# Patient Record
Sex: MALE | Race: OTHER | HISPANIC OR LATINO | Employment: FULL TIME | URBAN - NONMETROPOLITAN AREA
[De-identification: names, ages, dates, MRNs, and addresses within clinical notes are randomized per-mention and may not be internally consistent; named-entity substitution may affect disease eponyms.]

---

## 2023-01-05 ENCOUNTER — OCCUPATIONAL MEDICINE (OUTPATIENT)
Dept: URGENT CARE | Facility: CLINIC | Age: 38
End: 2023-01-05
Payer: COMMERCIAL

## 2023-01-05 ENCOUNTER — APPOINTMENT (OUTPATIENT)
Dept: RADIOLOGY | Facility: IMAGING CENTER | Age: 38
End: 2023-01-05
Attending: FAMILY MEDICINE
Payer: COMMERCIAL

## 2023-01-05 VITALS
RESPIRATION RATE: 16 BRPM | SYSTOLIC BLOOD PRESSURE: 122 MMHG | HEART RATE: 60 BPM | TEMPERATURE: 98.3 F | HEIGHT: 70 IN | DIASTOLIC BLOOD PRESSURE: 84 MMHG | WEIGHT: 210 LBS | OXYGEN SATURATION: 99 % | BODY MASS INDEX: 30.06 KG/M2

## 2023-01-05 DIAGNOSIS — S39.012A BACK STRAIN, INITIAL ENCOUNTER: ICD-10-CM

## 2023-01-05 PROCEDURE — 72070 X-RAY EXAM THORAC SPINE 2VWS: CPT | Mod: TC | Performed by: RADIOLOGY

## 2023-01-05 PROCEDURE — 99203 OFFICE O/P NEW LOW 30 MIN: CPT | Performed by: FAMILY MEDICINE

## 2023-01-05 RX ORDER — MELOXICAM 7.5 MG/1
7.5 TABLET ORAL DAILY
Qty: 7 TABLET | Refills: 0 | Status: SHIPPED | OUTPATIENT
Start: 2023-01-05

## 2023-01-05 RX ORDER — LIDOCAINE AND PRILOCAINE 25; 25 MG/G; MG/G
CREAM TOPICAL
COMMUNITY
Start: 2022-12-30 | End: 2023-01-05

## 2023-01-05 RX ORDER — CYCLOBENZAPRINE HCL 5 MG
5 TABLET ORAL 3 TIMES DAILY PRN
Qty: 30 TABLET | Refills: 0 | Status: SHIPPED | OUTPATIENT
Start: 2023-01-05

## 2023-01-05 RX ORDER — CYCLOBENZAPRINE HCL 10 MG
TABLET ORAL
COMMUNITY
Start: 2022-12-30 | End: 2023-01-05

## 2023-01-05 ASSESSMENT — ENCOUNTER SYMPTOMS
MYALGIAS: 1
BACK PAIN: 1

## 2023-01-05 NOTE — LETTER
[facility]  [address] [place] [address]  Phone:  [phone] - Fax:  [phone]   [facility] Progress Report and Disability Certification  Date of Service: 1/5/2023   No Show:  No  Date / Time of Next Visit: 1/11/2023 Edson Kwok   Claim Information   Patient Name: Juan Hi  Claim Number:     Employer: [employer]  Date of Injury: 12/28/2022     Insurer / TPA: [employer]  ID / SSN:     Occupation: Production  Diagnosis: The encounter diagnosis was Back strain, initial encounter.    Medical Information   Related to Industrial Injury? Yes    Subjective Complaints:  DOI 12/28/22  RODGER: working in awkward position for about an hour to install a mud flap that didn't fit correctly into wheel well of a truck and while doing this sneezed and felt pain to upper Rt side of back. Is radiating up to shoulder/trap area and initially to lower back as well but that area has improved. No limb weakness, worse w/ certain movements/positions.    Objective Findings:     Pre-Existing Condition(s):     Assessment:   Initial Visit    Status: Additional Care Required  Permanent Disability:No    Plan: Medication    Diagnostics: X-ray    Comments:       Disability Information   Status: Released to Restricted Duty    From:  1/5/2023  Through: 1/11/2023 Restrictions are: Temporary   Physical Restrictions   Sitting:    Standing:    Stooping:    Bending:      Squatting:    Walking:    Climbing:    Pushing:      Pulling:    Other:    Reaching Above Shoulder (L):   Reaching Above Shoulder (R):       Reaching Below Shoulder (L):    Reaching Below Shoulder (R):      Not to exceed Weight Limits   Carrying(hrs):   Weight Limit(lb):   Lifting(hrs):   Weight  Limit(lb):     Comments: ** Sedentary duty with walking/standing only as tolerated. No lifting, pulling, pushing more than 5 lbs  **    Repetitive Actions   Hands: i.e. Fine Manipulations from  Grasping:     Feet: i.e. Operating Foot Controls:     Driving / Operate Machinery:     Health Care Provider’s Original or Electronic Signature  Piyush Swan M.D. Health Care Provider’s Original or Electronic Signature    Jorden Paez DO MPH     Clinic Name / Location: [address] [address] Clinic Phone Number: Dept: [phone]   Appointment Time: 2:25 Pm Visit Start Time: 2:44 PM   Check-In Time:  2:30 Pm Visit Discharge Time:  341pm   Original-Treating Physician or Chiropractor    Page 2-Insurer/TPA    Page 3-Employer    Page 4-Employee

## 2023-01-05 NOTE — PROGRESS NOTES
"Subjective     Juan Hi is a 37 y.o. male who presents with Back Injury (DOI 12/28/22, MID BACK INJURY)      DOI 12/28/22  RODGER: working in awkward position for about an hour to install a mud flap that didn't fit correctly into wheel well of a truck and while doing this sneezed and felt pain to upper Rt side of back. Is radiating up to shoulder/trap area and initially to lower back as well but that area has improved. No limb weakness, worse w/ certain movements/positions.      HPI    Review of Systems   Musculoskeletal:  Positive for back pain and myalgias.   All other systems reviewed and are negative.           Objective     /84 (BP Location: Right arm, Patient Position: Sitting, BP Cuff Size: Adult)   Pulse 60   Temp 36.8 °C (98.3 °F) (Temporal)   Resp 16   Ht 1.778 m (5' 10\")   Wt 95.3 kg (210 lb)   SpO2 99%   BMI 30.13 kg/m²      Physical Exam  Vitals and nursing note reviewed.   Constitutional:       General: He is not in acute distress.     Appearance: Normal appearance. He is well-developed.   HENT:      Head: Normocephalic.   Pulmonary:      Effort: Pulmonary effort is normal. No respiratory distress.   Musculoskeletal:        Arms:       Comments: No wounds  No gross deformity  No discoloration or rash  Some pain to palp/rom    Neurological:      Mental Status: He is alert.      Motor: No abnormal muscle tone.   Psychiatric:         Mood and Affect: Mood normal.         Behavior: Behavior normal.       Assessment & Plan       1. Back strain, initial encounter  DX-THORACIC SPINE-2 VIEWS    cyclobenzaprine (FLEXERIL) 5 mg tablet    meloxicam (MOBIC) 7.5 MG Tab    Referral to Occupational Medicine          Sedentary duty with walking/standing only as tolerated. No lifting/pulling more than 5 lbs    1 wk recheck, sooner if needed, ER if worse      "

## 2023-01-05 NOTE — LETTER
"EMPLOYEE’S CLAIM FOR COMPENSATION/ REPORT OF INITIAL TREATMENT  FORM C-4    EMPLOYEE’S CLAIM - PROVIDE ALL INFORMATION REQUESTED   First Name  Juan Last Name  Sussy Birthdate                    1985                Sex  male Claim Number (Insurer’s Use Only)    Home Address  [address] Age  37 y.o. Height  1.778 m (5' 10\") Weight  95.3 kg (210 lb) [facility]     [facility] Zip  [address] Telephone  [phone] (home)    Mailing Address  [address] [facility] Zip  [address] Primary Language Spoken  English    Insurer  [employer] Third-[employer]   [employer]   Employee's Occupation (Job Title) When Injury or Occupational Disease Occurred  Production    Employer's Name/Company Name  [email or web address]  Telephone  [phone]    Office Mail Address (Number and Street)   [address] [place] [address]  Zip  [address]    Date of Injury  12/28/2022               Hours Injury  11:00 AM Date Employer Notified  1/5/2023 Last Day of Work after Injury     or Occupational Disease  12/28/2022 Supervisor to Whom Injury     Reported  [facility]   Address or Location of Accident (if applicable)  Work [1]   What were you doing at the time of accident? (if applicable)  INSTALLING PARTS ON SEMI TRUCK    How did this injury or occupational disease occur? (Be specific an answer in detail. Use additional sheet if necessary)  While installing part inside wheel well, in contorted position, and supporting weightwith full outstretchedarms fora long period of time, i sneezed, causing something in my spine to pop.   If you believe that you have an occupational disease, when did you first have knowledge of the disability and it relationship to your employment?   Witnesses to the Accident        Nature of Injury or Occupational Disease  Inflammation  Part(s) of Body Injured or Affected  Lower Back Area (Lumbar " Area & Lumbo-Sacral), Upper Back Area (Thoracic Area), N/A    I certify that the above is true and correct to the best of my knowledge and that I have provided this information in order to obtain the benefits of [place]  I hereby authorize any physician, chiropractor, surgeon, practitioner, or other person, any hospital, including [facility] or [facility], any medical service organization, any insurance company, or other institution or organization to release to each other, any medical or other information, including benefits paid or payable, pertinent to this injury or disease, except information relative to diagnosis, treatment and/or counseling for AIDS, psychological conditions, alcohol or controlled substances, for which I must give specific authorization.  A Photostat of this authorization shall be as valid as the original.     Date   Place Employee’s Original or  *Electronic Signature   THIS REPORT MUST BE COMPLETED AND MAILED WITHIN 3 WORKING DAYS OF TREATMENT   Place  [facility]  Name of Facility  [facility]   Date  1/5/2023 Diagnosis and Description of Injury or Occupational Disease  (S39.012A) Back strain, initial encounter Is there evidence the injured employee was under the influence of alcohol and/or another controlled substance at the time of accident?  ? No ? Yes (if yes, please explain)    Hour  2:44 PM   The encounter diagnosis was Back strain, initial encounter. No   Treatment  X-ray, Rx meds, work restrictions, Occ-Med follow up   Have you advised the patient to remain off work five days or     more?    X-Ray Findings  Negative   ? Yes Indicate dates:   From   To      From information given by the employee, together with medical evidence, can        you directly connect this injury or occupational disease as job incurred?  Yes ? No If no, is the  "injured employee capable of:  ? full duty  No ? modified duty  Yes   Is additional medical care by a physician indicated?  Yes If Modified Duty, Specify any Limitations / Restrictions  Sedentary duty with walking/standing only as tolerated. No lifting/pulling more than 5 lbs   Do you know of any previous injury or disease contributing to this condition or occupational disease?  ? Yes ? No (Explain if yes)                          No   Date  1/5/2023 Print Health Care Provider's   Piyush Chcaon M.D. I certify the employer’s copy of  this form was mailed on:   Address  [address] [facility] Insurer’s Use Only     [facility]  [address]    Provider’s Tax ID Number  [id number] Telephone  Dept: [phone]             Health Care Provider’s Original or Electronic Signature  e-SignPIYUSH CHACON M.D. Degree (MD,DO, DC,PAPoonamC,APRN)   MD      * Complete and attach Release of Information (Form C-4A) when injured employee signs C-4 Form electronically  ORIGINAL - TREATING HEALTHCARE PROVIDER PAGE 2 - INSURER/TPA PAGE 3 - EMPLOYER PAGE 4 - EMPLOYEE             Form C-4 (rev.08/21)           BRIEF DESCRIPTION OF RIGHTS AND BENEFITS  (Pursuant to [place] 616C.050)    Notice of Injury or Occupational Disease (Incident Report Form C-1): If an injury or occupational disease (OD) arises out of and in the course of employment, you must provide written notice to your employer as soon as practicable, but no later than 7 days after the accident or OD. Your employer shall maintain a sufficient supply of the required forms.    Claim for Compensation (Form C-4): If medical treatment is sought, the form C-4 is available at the place of initial treatment. A completed \"Claim for Compensation\" (Form C-4) must be filed within 90 days after an accident or OD. The treating physician or chiropractor must, within 3 working days after treatment, complete and mail to the employer, the employer's insurer and third-party " , the Claim for Compensation.    Medical Treatment: If you require medical treatment for your on-the-job injury or OD, you may be required to select a physician or chiropractor from a list provided by your workers’ compensation insurer, if it has contracted with an Organization for Managed Care (MCO) or Preferred Provider Organization (PPO) or providers of health care. If your employer has not entered into a contract with an MCO or PPO, you may select a physician or chiropractor from the Panel of Physicians and Chiropractors. Any medical costs related to your industrial injury or OD will be paid by your insurer.    Temporary Total Disability (TTD): If your doctor has certified that you are unable to work for a period of at least 5 consecutive days, or 5 cumulative days in a 20-day period, or places restrictions on you that your employer does not accommodate, you may be entitled to TTD compensation.    Temporary Partial Disability (TPD): If the wage you receive upon reemployment is less than the compensation for TTD to which you are entitled, the insurer may be required to pay you TPD compensation to make up the difference. TPD can only be paid for a maximum of 24 months.    Permanent Partial Disability (PPD): When your medical condition is stable and there is an indication of a PPD as a result of your injury or OD, within 30 days, your insurer must arrange for an evaluation by a rating physician or chiropractor to determine the degree of your PPD. The amount of your PPD award depends on the date of injury, the results of the PPD evaluation, your age and wage.    Permanent Total Disability (PTD): If you are medically certified by a treating physician or chiropractor as permanently and totally disabled and have been granted a PTD status by your insurer, you are entitled to receive monthly benefits not to exceed 66 2/3% of your average monthly wage. The amount of your PTD payments is subject to reduction  if you previously received a lump-sum PPD award.    Vocational Rehabilitation Services: You may be eligible for vocational rehabilitation services if you are unable to return to the job due to a permanent physical impairment or permanent restrictions as a result of your injury or occupational disease.    Transportation and Per Klever Reimbursement: You may be eligible for travel expenses and per klever associated with medical treatment.    Reopening: You may be able to reopen your claim if your condition worsens after claim closure.     Appeal Process: If you disagree with a written determination issued by the insurer or the insurer does not respond to your request, you may appeal to the Department of Administration, , by following the instructions contained in your determination letter. You must appeal the determination within 70 days from the date of the determination letter at [address], [address], [place], [place] [address], or [address], [address], [place] [address]. If you disagree with the  decision, you may appeal to the Department of Administration, . You must file your appeal within 30 days from the date of the  decision letter at [address], [address], [place], [place] [address], or [address], [address], [place], [place] [address]. If you disagree with a decision of an , you may file a petition for judicial review with the District Court. You must do so within 30 days of the Appeal Officer’s decision. You may be represented by an  at your own expense or you may contact the [facility] for possible representation.    [place]  for Injured Workers (NAIW): If you disagree with a  decision, you may request that NAIW represent you without charge at an  Hearing. For information regarding denial of benefits, you may contact the [facility] at: [address], [address], [place] [address], [phone], or [address], [address], [place] [address], [phone]    To File a Complaint with the Division: If you wish to file a complaint with the  of the Division of Industrial Relations (DIR),  please contact the Workers’ Compensation Section, [address], [address], [place], [place] [address], telephone ([phone]) [phone], or [address], [address], [place], [place] [address], telephone [phone].    For assistance with Workers’ Compensation Issues: You may contact the [facility] Office for Consumer Health Assistance, [address], [facility] [address], [place] [address], Toll Free 1-456.403.7640, Web site: [email or web address].[email or web address]/Programs/SHELLY E-mail: [email or web address]              __________________________________________________________________                                    _________________            Employee Name / Signature                                                                                                                            Date                                                                                                                                                                                                                              D-2 (rev. 10/20)

## 2023-01-12 ENCOUNTER — OCCUPATIONAL MEDICINE (OUTPATIENT)
Dept: OCCUPATIONAL MEDICINE | Facility: CLINIC | Age: 38
End: 2023-01-12
Payer: COMMERCIAL

## 2023-01-12 VITALS
SYSTOLIC BLOOD PRESSURE: 110 MMHG | WEIGHT: 200.6 LBS | BODY MASS INDEX: 28.72 KG/M2 | RESPIRATION RATE: 16 BRPM | TEMPERATURE: 98.6 F | DIASTOLIC BLOOD PRESSURE: 62 MMHG | HEIGHT: 70 IN | OXYGEN SATURATION: 97 % | HEART RATE: 60 BPM

## 2023-01-12 DIAGNOSIS — S29.012A STRAIN OF THORACIC SPINE: ICD-10-CM

## 2023-01-12 DIAGNOSIS — S39.012D STRAIN OF LUMBAR REGION, SUBSEQUENT ENCOUNTER: ICD-10-CM

## 2023-01-12 PROCEDURE — 99203 OFFICE O/P NEW LOW 30 MIN: CPT | Performed by: PREVENTIVE MEDICINE

## 2023-01-12 NOTE — LETTER
[address],   [address] [place] [address]  Phone:  [phone] - Fax:  [phone]   Occupational Health [facility] Progress Report and Disability Certification  Date of Service: 1/12/2023   No Show:  No  Date / Time of Next Visit: 1/26/2023   Claim Information   Patient Name: Juan Hi  Claim Number:     Employer: [employer]  Date of Injury: 12/28/2022     Insurer / TPA: [employer] ID / SSN:     Occupation: Production  Diagnosis: Diagnoses of Strain of lumbar region, subsequent encounter and Strain of thoracic spine were pertinent to this visit.    Medical Information   Related to Industrial Injury? Yes    Subjective Complaints:  DOI 12/28/22: 37-year-old injured worker presents with mid to low back injury.  RODGER: working in awkward position for about an hour to install a mud flap that didn't fit correctly into wheel well of a truck and while doing this sneezed and felt pain to upper Rt side of back.  He was seen urgent care x1, x-rays of the thoracic spine were negative.  He was given meloxicam, Flexeril and work restrictions.  Patient states overall most the pain has improved except for pain right in the right upper thoracic spine area.  Patient states the meloxicam and Flexeril did seem to help.  He states it is still painful with bending or with certain movements of the right arm.  He states the pain shoots up from next of the scapula up to the neck.  Has not worked since being seen in urgent care.   Objective Findings: Thoracic: No gross deformity.  Area of pain between T4-T7 right paraspinal musculature and rhomboids.  Painful range of motion especially with bending.   Pre-Existing Condition(s):     Assessment:   Condition Same    Status: Additional Care Required  Permanent Disability:No    Plan:      Diagnostics:      Comments:  Referral for MRI thoracic spine without  Referral to physical therapy  Continue meloxicam, then transition to OTC  ibuprofen/Tylenol as needed  Use Flexeril as needed  Okay to use heat/ice as needed  Gentle range of motion exercises  Restricted duty  Follow-up 2 weeks    Disability Information   Status: Released to Restricted Duty    From:  1/12/2023  Through: 1/26/2023 Restrictions are: Temporary   Physical Restrictions   Sitting:    Standing:    Stooping:  < or = to 1 hr/day Bending:  < or = to 1 hr/day   Squatting:    Walking:    Climbing:    Pushing:      Pulling:    Other:    Reaching Above Shoulder (L):   Reaching Above Shoulder (R):       Reaching Below Shoulder (L):    Reaching Below Shoulder (R):      Not to exceed Weight Limits   Carrying(hrs):   Weight Limit(lb): < or = to 10 pounds Lifting(hrs):   Weight  Limit(lb): < or = to 10 pounds   Comments:      Repetitive Actions   Hands: i.e. Fine Manipulations from Grasping:     Feet: i.e. Operating Foot Controls:     Driving / Operate Machinery:     Health Care Provider’s Original or Electronic Signature  Jorden Paez D.O. Health Care Provider’s Original or Electronic Signature    Jorden Paez DO MPH     Clinic Name / Location: [address],   [place] [address]  [place] [address] Clinic Phone Number: Dept: [phone]   Appointment Time: 2:45 Pm Visit Start Time: 2:54 PM   Check-In Time:  2:53 Pm Visit Discharge Time:  3:34 PM   Original-Treating Physician or Chiropractor    Page 2-Insurer/TPA    Page 3-Employer    Page 4-Employee

## 2023-01-12 NOTE — PROGRESS NOTES
"Subjective:     Juan Hi is a 37 y.o. male who presents for Other (New wc doi: 1/05/2023 back feeling better rm 17)      DOI 12/28/22: 37-year-old injured worker presents with mid to low back injury.  RODGER: working in awkward position for about an hour to install a mud flap that didn't fit correctly into wheel well of a truck and while doing this sneezed and felt pain to upper Rt side of back.  He was seen urgent care x1, x-rays of the thoracic spine were negative.  He was given meloxicam, Flexeril and work restrictions.  Patient states overall most the pain has improved except for pain right in the right upper thoracic spine area.  Patient states the meloxicam and Flexeril did seem to help.  He states it is still painful with bending or with certain movements of the right arm.  He states the pain shoots up from next of the scapula up to the neck.  Has not worked since being seen in urgent care.    ROS: All systems were reviewed on intake form, form was reviewed and signed. See scanned documents in media. Pertinent positives and negatives included in HPI.    PMH: No pertinent past medical history to this problem  MEDS: Medications were reviewed in Epic  ALLERGIES:   Allergies   Allergen Reactions    Beef-Derived Products      Other reaction(s): Other, Other (See Comments)  Patient states having allergy to Beef.   Patient states having allergy to Beef.        SOCHX: Works as a  at [employer]  FH: No pertinent family history to this problem       Objective:     /62 (BP Location: Left arm, Patient Position: Sitting, BP Cuff Size: Adult long)   Pulse 60   Temp 37 °C (98.6 °F) (Temporal)   Resp 16   Ht 1.778 m (5' 10\")   Wt 91 kg (200 lb 9.6 oz)   SpO2 97%   BMI 28.78 kg/m²     Constitutional: Patient is in no acute distress. Appears well-developed and well-nourished.   HENT: Normocephalic and atraumatic. EOM are normal. No scleral icterus.   Cardiovascular: Normal rate.  "   Pulmonary/Chest: Effort normal. No respiratory distress.   Neurological: Patient is alert and oriented to person, place, and time.   Skin: Skin is warm and dry.   Psychiatric: Normal mood and affect. Behavior is normal.     Thoracic: No gross deformity.  Area of pain between T4-T7 right paraspinal musculature and rhomboids.  Painful range of motion especially with bending.    Assessment/Plan:       1. Strain of lumbar region, subsequent encounter  - Referral to Physical Therapy    2. Strain of thoracic spine  - Referral to Physical Therapy  - Referral to Radiology  - MR-THORACIC SPINE-W/O; Future    Released to Restricted Duty FROM 1/12/2023 TO 1/26/2023     Referral for MRI thoracic spine without  Referral to physical therapy  Continue meloxicam, then transition to OTC ibuprofen/Tylenol as needed  Use Flexeril as needed  Okay to use heat/ice as needed  Gentle range of motion exercises  Restricted duty  Follow-up 2 weeks    Differential diagnosis, natural history, supportive care, and indications for immediate follow-up discussed.    Approximately 35 minutes were spent in reviewing notes, preparing for visit, obtaining history, exam and evaluation, patient counseling/education and post visit documentation/orders.

## 2023-01-26 ENCOUNTER — OCCUPATIONAL MEDICINE (OUTPATIENT)
Dept: OCCUPATIONAL MEDICINE | Facility: CLINIC | Age: 38
End: 2023-01-26
Payer: COMMERCIAL

## 2023-01-26 VITALS
WEIGHT: 200.6 LBS | TEMPERATURE: 98.6 F | OXYGEN SATURATION: 98 % | RESPIRATION RATE: 16 BRPM | SYSTOLIC BLOOD PRESSURE: 134 MMHG | HEART RATE: 88 BPM | DIASTOLIC BLOOD PRESSURE: 62 MMHG | BODY MASS INDEX: 28.78 KG/M2

## 2023-01-26 DIAGNOSIS — S29.012A STRAIN OF THORACIC SPINE: ICD-10-CM

## 2023-01-26 DIAGNOSIS — S39.012D STRAIN OF LUMBAR REGION, SUBSEQUENT ENCOUNTER: ICD-10-CM

## 2023-01-26 PROCEDURE — 99999 PR NO CHARGE: CPT | Performed by: PREVENTIVE MEDICINE

## 2023-01-26 NOTE — LETTER
[address],   [address] [place] [address]  Phone:  [phone] - Fax:  [phone]   [facility] Progress Report and Disability Certification  Date of Service: 1/26/2023   No Show:  No  Date / Time of Next Visit:  Release from care   Claim Information   Patient Name: Juan Hi  Claim Number:     Employer: [employer]  Date of Injury: 12/28/2022     Insurer / TPA: [employer]  ID / SSN:     Occupation: Production  Diagnosis: Diagnoses of Strain of lumbar region, subsequent encounter and Strain of thoracic spine were pertinent to this visit.    Medical Information   Related to Industrial Injury? Yes    Subjective Complaints:  DOI 12/28/22: 37-year-old injured worker presents with mid to low back injury.  RODGER: working in awkward position for about an hour to install a mud flap that didn't fit correctly into wheel well of a truck and while doing this sneezed and felt pain to upper Rt side of back.  X-rays of the thoracic spine were negative.   Patient states that overall he feels about 95% improved.  Has minimal pain at this point.  He only has a bit of pain with stooping forward with the neck.  He does get some pain with sudden movements occasionally.  However he feels ready for release and full duty.   Objective Findings: Thoracic: No gross deformity.  Full range of motion without pain or difficulty.   Pre-Existing Condition(s):     Assessment:   Condition Improved    Status: Discharged /  MMI  Permanent Disability:No    Plan:      Diagnostics:      Comments:    Released from care  Full duty  Expect complete resolution of symptoms next 3 to 4 weeks.  If symptoms worsen return to clinic.    Disability Information   Status: Released to Full Duty    From:  1/26/2023  Through:   Restrictions are:     Physical Restrictions   Sitting:    Standing:    Stooping:    Bending:      Squatting:    Walking:    Climbing:    Pushing:       Pulling:    Other:    Reaching Above Shoulder (L):   Reaching Above Shoulder (R):       Reaching Below Shoulder (L):    Reaching Below Shoulder (R):      Not to exceed Weight Limits   Carrying(hrs):   Weight Limit(lb):   Lifting(hrs):   Weight  Limit(lb):     Comments:      Repetitive Actions   Hands: i.e. Fine Manipulations from Grasping:     Feet: i.e. Operating Foot Controls:     Driving / Operate Machinery:     Health Care Provider’s Original or Electronic Signature  Jorden Paez D.O. Health Care Provider’s Original or Electronic Signature    Jorden Paez DO MPH     Clinic Name / Location: [address],   [address]  [place] [address] Clinic Phone Number: Dept: [phone]   Appointment Time: 2:45 Pm Visit Start Time: 2:44 PM   Check-In Time:  2:42 Pm Visit Discharge Time:  3:10PM   Original-Treating Physician or Chiropractor    Page 2-Insurer/TPA    Page 3-Employer    Page 4-Employee

## 2023-01-26 NOTE — PROGRESS NOTES
Subjective:     Juan Hi is a 37 y.o. male who presents for Follow-Up (Fu wc doi: 1/05/2023 back feeling better rm 17)      DOI 12/28/22: 37-year-old injured worker presents with mid to low back injury.  RODGER: working in awkward position for about an hour to install a mud flap that didn't fit correctly into wheel well of a truck and while doing this sneezed and felt pain to upper Rt side of back.  X-rays of the thoracic spine were negative.   Patient states that overall he feels about 95% improved.  Has minimal pain at this point.  He only has a bit of pain with stooping forward with the neck.  He does get some pain with sudden movements occasionally.  However he feels ready for release and full duty.    ROS         Objective:     /62 (BP Location: Right arm, Patient Position: Sitting, BP Cuff Size: Adult long)   Pulse 88   Temp 37 °C (98.6 °F) (Temporal)   Resp 16   Wt 91 kg (200 lb 9.6 oz)   SpO2 98%   BMI 28.78 kg/m²     Constitutional: Patient is in no acute distress. Appears well-developed and well-nourished.   Cardiovascular: Normal rate.    Pulmonary/Chest: Effort normal. No respiratory distress.   Neurological: Patient is alert and oriented to person, place, and time.   Skin: Skin is warm and dry.   Psychiatric: Normal mood and affect. Behavior is normal.     Thoracic: No gross deformity.  Full range of motion without pain or difficulty.    Assessment/Plan:       1. Strain of lumbar region, subsequent encounter    2. Strain of thoracic spine    Released to Full Duty FROM 1/26/2023 TO         Released from care  Full duty  Expect complete resolution of symptoms next 3 to 4 weeks.  If symptoms worsen return to clinic.    Differential diagnosis, natural history, supportive care, and indications for immediate follow-up discussed.    Approximately 15 minutes was spent in preparing for visit, obtaining history, exam and evaluation, patient counseling/education and post visit  documentation/orders.